# Patient Record
Sex: MALE | Race: WHITE
[De-identification: names, ages, dates, MRNs, and addresses within clinical notes are randomized per-mention and may not be internally consistent; named-entity substitution may affect disease eponyms.]

---

## 2021-11-30 ENCOUNTER — HOSPITAL ENCOUNTER (OUTPATIENT)
Dept: HOSPITAL 53 - M PLAIMG | Age: 51
End: 2021-11-30
Attending: INTERNAL MEDICINE
Payer: COMMERCIAL

## 2021-11-30 DIAGNOSIS — M54.50: ICD-10-CM

## 2021-11-30 DIAGNOSIS — R94.5: Primary | ICD-10-CM

## 2021-11-30 DIAGNOSIS — R70.0: ICD-10-CM

## 2021-11-30 LAB
CRP SERPL-MCNC: 1.14 MG/DL (ref 0–0.3)
HBV SURFACE AB SER-ACNC: NEGATIVE M[IU]/ML
HCV AB SER QL: 0.1 INDEX (ref ?–0.8)
PROT SERPL-MCNC: 8.5 GM/DL (ref 6.4–8.2)

## 2021-12-02 LAB
ALBUMIN MFR UR ELPH: 45.9 % (ref 55.8–66.1)
ALBUMIN SERPL-MCNC: 3.9 GM/DL (ref 3.29–5.55)
ALPHA1 GLOB MFR SERPL ELPH: 4.2 % (ref 2.9–4.9)
ALPHA1 GLOB SERPL ELPH-MCNC: 0.36 GM/DL (ref 0.17–0.41)
ALPHA2 GLOB SERPL ELPH-MCNC: 0.81 GM/DL (ref 0.42–0.99)
ALPHA2 GLOB SERPL ELPH-MCNC: 9.5 % (ref 7.1–11.8)
B-GLOBULIN SERPL ELPH-MCNC: 0.64 GM/DL (ref 0.28–0.6)
BETA1 GLOB MFR SERPL ELPH: 7.5 % (ref 4.7–7.2)
BETA2 GLOB MFR SERPL ELPH: 8.7 % (ref 3.2–6.5)
BETA2 GLOB SERPL ELPH-MCNC: 0.74 GM/DL (ref 0.19–0.55)
GAMMA GLOB 24H MFR UR ELPH: 24.2 % (ref 11.1–18.8)
GAMMA GLOB SERPL ELPH-MCNC: 2.06 GM/DL (ref 0.65–1.58)
PROT PATTERN SERPL ELPH-IMP: (no result)

## 2021-12-06 ENCOUNTER — HOSPITAL ENCOUNTER (OUTPATIENT)
Dept: HOSPITAL 53 - M RAD | Age: 51
End: 2021-12-06
Attending: INTERNAL MEDICINE
Payer: COMMERCIAL

## 2021-12-06 DIAGNOSIS — M54.50: Primary | ICD-10-CM

## 2021-12-06 DIAGNOSIS — K76.89: ICD-10-CM

## 2021-12-06 DIAGNOSIS — I77.819: ICD-10-CM

## 2021-12-06 DIAGNOSIS — K80.20: ICD-10-CM

## 2021-12-06 DIAGNOSIS — R94.5: ICD-10-CM

## 2021-12-06 DIAGNOSIS — R16.0: ICD-10-CM

## 2021-12-06 DIAGNOSIS — R05.3: ICD-10-CM

## 2021-12-06 DIAGNOSIS — I51.7: ICD-10-CM

## 2021-12-06 PROCEDURE — 71046 X-RAY EXAM CHEST 2 VIEWS: CPT

## 2021-12-06 PROCEDURE — 74178 CT ABD&PLV WO CNTR FLWD CNTR: CPT

## 2021-12-06 NOTE — REP
INDICATION:

ACUTE MARITA BACK PAIN W/O SCIATICA AND ELEVATED LFT/CT 1ST.



COMPARISON:

None.



TECHNIQUE:

Upright PA and lateral images of the chest were performed.



FINDINGS:

There is cardiomegaly and aortic ectasia consistent with benign essential

hypertension.  The lungs are clear.  The upper abdominal bowel gas pattern is normal.

There are no bony abnormalities.



IMPRESSION:

Findings consistent with hypertension.  No evidence of acute cardiopulmonary pathology.





<Electronically signed by Kingsley Rouse > 12/06/21 [id number]

## 2021-12-07 NOTE — REP
INDICATION:

ACCUTE MARITA BACK PAIN W/O SCIATICA AND ELEVATED LFT.



COMPARISON:

None



TECHNIQUE:

Axial contrast-enhanced images from the lung bases to the pubic symphysis using 100 cc

Isovue 370 intravenous contrast material.  Precontrast images of the abdomen along

with coronal and sagittal reformations obtained.



This CT examination was performed using the following dose reduction techniques:

Automated exposure control, adjustment of mA and/or kv according to the patient's

size, and the use of iterative reconstruction technique.



FINDINGS:

Lung bases are clear.



Liver is enlarged and demonstrates coarsened somewhat heterogeneous texture suggesting

underlying hepatocellular disease.  No focal hepatic lesion identified.  Patent

umbilical vein is also noted and findings likely represent elements of cirrhosis and

portal hypertension.



Spleen, pancreas, bilateral adrenal glands and kidneys are normal.



Small gallstones are identified without evidence for acute cholecystitis.



The enteric system including stomach, small, and large bowel appears normal.  No

evidence for obstruction or acute inflammatory process.  Normal terminal ileum and

appendix are identified in the right lower quadrant.



Pelvis demonstrates normal bladder and age-appropriate prostate/seminal vesicles.



No ascites.  No free air.  No intraperitoneal or retroperitoneal adenopathy.

Abdominal aorta and vasculature appear normal.  Musculoskeletal structures are intact

and without acute osseous abnormality.



IMPRESSION:

1.  Hepatocellular changes as described above suggest cirrhosis.  No focal hepatic

lesion identified.

2.  Cholelithiasis.





<Electronically signed by Jose Maria Sheppard > 12/07/21 0927

## 2021-12-09 ENCOUNTER — HOSPITAL ENCOUNTER (OUTPATIENT)
Dept: HOSPITAL 53 - M PLALAB | Age: 51
End: 2021-12-09
Attending: INTERNAL MEDICINE
Payer: COMMERCIAL

## 2021-12-09 DIAGNOSIS — R70.0: Primary | ICD-10-CM

## 2021-12-09 LAB
ALBUMIN SERPL BCG-MCNC: 3.1 GM/DL (ref 3.2–5.2)
ALT SERPL W P-5'-P-CCNC: 39 U/L (ref 12–78)
BASOPHILS # BLD AUTO: 0.1 10^3/UL (ref 0–0.2)
BASOPHILS NFR BLD AUTO: 2.3 % (ref 0–1)
BILIRUB SERPL-MCNC: 0.6 MG/DL (ref 0.2–1)
BUN SERPL-MCNC: 8 MG/DL (ref 7–18)
CALCIUM SERPL-MCNC: 8.2 MG/DL (ref 8.5–10.1)
CHLORIDE SERPL-SCNC: 102 MEQ/L (ref 98–107)
CO2 SERPL-SCNC: 28 MEQ/L (ref 21–32)
CREAT SERPL-MCNC: 0.9 MG/DL (ref 0.7–1.3)
CRP SERPL-MCNC: 1.08 MG/DL (ref 0–0.3)
EOSINOPHIL # BLD AUTO: 0.2 10^3/UL (ref 0–0.5)
EOSINOPHIL NFR BLD AUTO: 4.9 % (ref 0–3)
ERYTHROCYTE [SEDIMENTATION RATE] IN BLOOD BY WESTERGREN METHOD: 53 MM/HR (ref 0–15)
GFR SERPL CREATININE-BSD FRML MDRD: > 60 ML/MIN/{1.73_M2} (ref 60–?)
GLUCOSE SERPL-MCNC: 186 MG/DL (ref 70–100)
HCT VFR BLD AUTO: 40.6 % (ref 42–52)
HGB BLD-MCNC: 14.1 G/DL (ref 13.5–17.5)
LYMPHOCYTES # BLD AUTO: 0.9 10^3/UL (ref 1.5–5)
LYMPHOCYTES NFR BLD AUTO: 22.6 % (ref 24–44)
MCH RBC QN AUTO: 36.3 PG (ref 27–33)
MCHC RBC AUTO-ENTMCNC: 34.7 G/DL (ref 32–36.5)
MCV RBC AUTO: 104.6 FL (ref 80–96)
MONOCYTES # BLD AUTO: 0.5 10^3/UL (ref 0–0.8)
MONOCYTES NFR BLD AUTO: 12.2 % (ref 2–8)
NEUTROPHILS # BLD AUTO: 2.2 10^3/UL (ref 1.5–8.5)
NEUTROPHILS NFR BLD AUTO: 57.7 % (ref 36–66)
PLATELET # BLD AUTO: 63 10^3/UL (ref 150–450)
POTASSIUM SERPL-SCNC: 4.1 MEQ/L (ref 3.5–5.1)
PROT SERPL-MCNC: 8.2 GM/DL (ref 6.4–8.2)
RBC # BLD AUTO: 3.88 10^6/UL (ref 4.3–6.1)
SODIUM SERPL-SCNC: 137 MEQ/L (ref 136–145)
WBC # BLD AUTO: 3.9 10^3/UL (ref 4–10)